# Patient Record
Sex: MALE | Race: WHITE | NOT HISPANIC OR LATINO | Employment: OTHER | URBAN - METROPOLITAN AREA
[De-identification: names, ages, dates, MRNs, and addresses within clinical notes are randomized per-mention and may not be internally consistent; named-entity substitution may affect disease eponyms.]

---

## 2023-03-09 ENCOUNTER — TRANSCRIBE ORDERS (OUTPATIENT)
Dept: PRIMARY CARE | Facility: CLINIC | Age: 78
End: 2023-03-09
Payer: COMMERCIAL

## 2023-03-09 DIAGNOSIS — I10 HTN (HYPERTENSION), BENIGN: Primary | ICD-10-CM

## 2023-03-09 RX ORDER — LISINOPRIL AND HYDROCHLOROTHIAZIDE 10; 12.5 MG/1; MG/1
1 TABLET ORAL ONCE
Status: CANCELLED | OUTPATIENT
Start: 2023-03-09 | End: 2023-03-09

## 2023-03-09 RX ORDER — VERAPAMIL HYDROCHLORIDE 240 MG/1
1 TABLET, FILM COATED, EXTENDED RELEASE ORAL DAILY
COMMUNITY
Start: 2020-12-23 | End: 2023-03-09 | Stop reason: SDUPTHER

## 2023-03-09 RX ORDER — LISINOPRIL AND HYDROCHLOROTHIAZIDE 12.5; 2 MG/1; MG/1
1 TABLET ORAL DAILY
COMMUNITY
Start: 2020-10-30 | End: 2023-03-09 | Stop reason: SDUPTHER

## 2023-03-14 RX ORDER — LISINOPRIL AND HYDROCHLOROTHIAZIDE 12.5; 2 MG/1; MG/1
1 TABLET ORAL DAILY
Qty: 90 TABLET | Refills: 1 | Status: SHIPPED | OUTPATIENT
Start: 2023-03-14 | End: 2023-03-20 | Stop reason: SDUPTHER

## 2023-03-14 RX ORDER — VERAPAMIL HYDROCHLORIDE 240 MG/1
240 TABLET, FILM COATED, EXTENDED RELEASE ORAL DAILY
Qty: 90 TABLET | Refills: 1 | Status: SHIPPED | OUTPATIENT
Start: 2023-03-14 | End: 2023-03-20 | Stop reason: SDUPTHER

## 2023-03-20 ENCOUNTER — TELEPHONE (OUTPATIENT)
Dept: PRIMARY CARE | Facility: CLINIC | Age: 78
End: 2023-03-20
Payer: COMMERCIAL

## 2023-03-20 DIAGNOSIS — I10 HTN (HYPERTENSION), BENIGN: ICD-10-CM

## 2023-03-20 RX ORDER — VERAPAMIL HYDROCHLORIDE 240 MG/1
240 TABLET, FILM COATED, EXTENDED RELEASE ORAL DAILY
Qty: 90 TABLET | Refills: 1 | Status: SHIPPED | OUTPATIENT
Start: 2023-03-20 | End: 2023-08-08

## 2023-03-20 RX ORDER — LISINOPRIL AND HYDROCHLOROTHIAZIDE 12.5; 2 MG/1; MG/1
1 TABLET ORAL DAILY
Qty: 90 TABLET | Refills: 1 | Status: SHIPPED | OUTPATIENT
Start: 2023-03-20 | End: 2023-08-08

## 2023-05-01 DIAGNOSIS — E78.5 HYPERLIPIDEMIA, UNSPECIFIED HYPERLIPIDEMIA TYPE: Primary | ICD-10-CM

## 2023-05-01 RX ORDER — ATORVASTATIN CALCIUM 10 MG/1
10 TABLET, FILM COATED ORAL DAILY
Qty: 90 TABLET | Refills: 1 | Status: SHIPPED | OUTPATIENT
Start: 2023-05-01 | End: 2023-10-18

## 2023-05-01 RX ORDER — ATORVASTATIN CALCIUM 10 MG/1
1 TABLET, FILM COATED ORAL DAILY
COMMUNITY
Start: 2020-10-30 | End: 2023-05-01 | Stop reason: SDUPTHER

## 2023-05-08 ENCOUNTER — OFFICE VISIT (OUTPATIENT)
Dept: PRIMARY CARE | Facility: CLINIC | Age: 78
End: 2023-05-08
Payer: COMMERCIAL

## 2023-05-08 VITALS
WEIGHT: 146 LBS | BODY MASS INDEX: 22.13 KG/M2 | OXYGEN SATURATION: 99 % | DIASTOLIC BLOOD PRESSURE: 66 MMHG | SYSTOLIC BLOOD PRESSURE: 134 MMHG | HEART RATE: 64 BPM | TEMPERATURE: 98 F | HEIGHT: 68 IN

## 2023-05-08 DIAGNOSIS — Z00.00 ROUTINE GENERAL MEDICAL EXAMINATION AT A HEALTH CARE FACILITY: ICD-10-CM

## 2023-05-08 DIAGNOSIS — Z12.11 SCREENING FOR COLORECTAL CANCER: ICD-10-CM

## 2023-05-08 DIAGNOSIS — Z23 NEED FOR VACCINATION: ICD-10-CM

## 2023-05-08 DIAGNOSIS — Z00.00 ROUTINE GENERAL MEDICAL EXAMINATION AT HEALTH CARE FACILITY: Primary | ICD-10-CM

## 2023-05-08 DIAGNOSIS — Z12.12 SCREENING FOR COLORECTAL CANCER: ICD-10-CM

## 2023-05-08 PROBLEM — E78.2 HYPERLIPEMIA, MIXED: Status: ACTIVE | Noted: 2023-05-08

## 2023-05-08 PROBLEM — I10 BENIGN ESSENTIAL HYPERTENSION: Status: ACTIVE | Noted: 2023-05-08

## 2023-05-08 PROCEDURE — 3078F DIAST BP <80 MM HG: CPT | Performed by: FAMILY MEDICINE

## 2023-05-08 PROCEDURE — 1159F MED LIST DOCD IN RCRD: CPT | Performed by: FAMILY MEDICINE

## 2023-05-08 PROCEDURE — G0009 ADMIN PNEUMOCOCCAL VACCINE: HCPCS | Performed by: FAMILY MEDICINE

## 2023-05-08 PROCEDURE — 99397 PER PM REEVAL EST PAT 65+ YR: CPT | Performed by: FAMILY MEDICINE

## 2023-05-08 PROCEDURE — 3075F SYST BP GE 130 - 139MM HG: CPT | Performed by: FAMILY MEDICINE

## 2023-05-08 PROCEDURE — 1170F FXNL STATUS ASSESSED: CPT | Performed by: FAMILY MEDICINE

## 2023-05-08 PROCEDURE — 1160F RVW MEDS BY RX/DR IN RCRD: CPT | Performed by: FAMILY MEDICINE

## 2023-05-08 PROCEDURE — 82274 ASSAY TEST FOR BLOOD FECAL: CPT

## 2023-05-08 PROCEDURE — 90677 PCV20 VACCINE IM: CPT | Performed by: FAMILY MEDICINE

## 2023-05-08 PROCEDURE — G0439 PPPS, SUBSEQ VISIT: HCPCS | Performed by: FAMILY MEDICINE

## 2023-05-08 PROCEDURE — 1036F TOBACCO NON-USER: CPT | Performed by: FAMILY MEDICINE

## 2023-05-08 ASSESSMENT — ACTIVITIES OF DAILY LIVING (ADL)
DRESSING: INDEPENDENT
GROCERY_SHOPPING: INDEPENDENT
TAKING_MEDICATION: INDEPENDENT
BATHING: INDEPENDENT
DOING_HOUSEWORK: INDEPENDENT
MANAGING_FINANCES: INDEPENDENT

## 2023-05-08 ASSESSMENT — PATIENT HEALTH QUESTIONNAIRE - PHQ9
10. IF YOU CHECKED OFF ANY PROBLEMS, HOW DIFFICULT HAVE THESE PROBLEMS MADE IT FOR YOU TO DO YOUR WORK, TAKE CARE OF THINGS AT HOME, OR GET ALONG WITH OTHER PEOPLE: NOT DIFFICULT AT ALL
2. FEELING DOWN, DEPRESSED OR HOPELESS: SEVERAL DAYS
1. LITTLE INTEREST OR PLEASURE IN DOING THINGS: NOT AT ALL
SUM OF ALL RESPONSES TO PHQ9 QUESTIONS 1 AND 2: 1

## 2023-05-08 ASSESSMENT — PAIN SCALES - GENERAL: PAINLEVEL: 0-NO PAIN

## 2023-05-08 ASSESSMENT — ENCOUNTER SYMPTOMS: HYPERTENSION: 1

## 2023-05-08 NOTE — PROGRESS NOTES
"Subjective   Reason for Visit: Jaspal Anderson is an 77 y.o. male here for a Medicare Wellness visit.     Past Medical, Surgical, and Family History reviewed and updated in chart.    Reviewed all medications by prescribing practitioner or clinical pharmacist (such as prescriptions, OTCs, herbal therapies and supplements) and documented in the medical record.    Hypertension        Patient Care Team:  Taty Domínguez MD as PCP - General (Family Medicine)  Marshall Renteria MD as PCP - [facility] Attributed Provider     Review of Systems    Objective   Vitals:  /66 (BP Location: Right arm, Patient Position: Sitting, BP Cuff Size: Adult)   Pulse 64   Temp 36.7 °C (98 °F) (Temporal)   Ht 1.727 m (5' 8\")   Wt 66.2 kg (146 lb)   SpO2 99%   BMI 22.20 kg/m²       Physical Exam  Vitals reviewed.   Constitutional:       Appearance: Normal appearance.   Cardiovascular:      Rate and Rhythm: Normal rate and regular rhythm.      Heart sounds: No murmur heard.  Pulmonary:      Effort: Pulmonary effort is normal.      Breath sounds: Normal breath sounds.   Musculoskeletal:      Right lower leg: No edema.      Left lower leg: No edema.   Neurological:      Mental Status: He is alert.   Psychiatric:         Mood and Affect: Mood normal.         Behavior: Behavior normal.         Assessment/Plan   Problem List Items Addressed This Visit    None  Visit Diagnoses       Routine general medical examination at health care facility    -  Primary    Relevant Orders    1 Year Follow Up In [facility] - PCP - Wellness Exam    Need for vaccination        Relevant Orders    Pneumococcal conjugate vaccine 20-valent IM    Routine general medical examination at a health care facility        Screening for colorectal cancer        Relevant Orders    Fecal Occult Bld Immunoassay               "

## 2023-05-08 NOTE — PATIENT INSTRUCTIONS
Immunizations recommended:  --Flu shot every fall.  --Prevnar 20 once, to reduce risk of pneumonia.  Done today.  --Shingrix, to reduce the risk of shingles.  It can be done at the pharmacy.    --Tetanus every 10 years.  --Covid vaccine.    Colonoscopy should be done every 10 years after the age of 45, unless there was a previous abnormality, or family history of colon cancer.  Alternatives would be Cologuard every 3 years (looks for genetic evidence of colon cancer in stool), or stool FIT stool test yearly (looks for microscopic blood in stool)--FIT given to do and send back.    You should try to get 150 minutes of exercise weekly.  Be sure to eat a diet rich in fruits and vegetables, and low in saturated fats and sodium.  Strive for a healthy BMI of less than 25.     Make sure to wear sun screen when outside, and check for changing or unusual moles.    I recommend you get a Living Will and Durable Power of  for Healthcare. These documents state what care you would want if you couldn't speak for yourself. They help your loved ones in caring for you if that happens.   Once you have those forms completed, you can keep a copy on file with your doctor.    Specialists being seen:  Dr. Maldonado for prostate.    BP well controlled.  Continue lisinopril 20 mg, hydrochlorothiazide 12.5 mg, and verapamil 240 mg daily.  Will check blood work next visit in 6 months.                                                                                           Blood Pressure Treatment goals include:                                                                                    BP of 120/80, with no higher than 140/85 on consistent basis.                               Exercise at 150 minutes weekly.  Eat a balanced diet, rich in fruits and vegetables, low in sodium and processed foods.  If you are overweight, work toward a goal BMI of less than 25.    Continue atorvastatin for cholesterol.

## 2023-05-13 LAB — FECAL OCCULT BLD IMMUNOASSAY: NEGATIVE

## 2023-08-07 DIAGNOSIS — I10 HTN (HYPERTENSION), BENIGN: ICD-10-CM

## 2023-08-08 RX ORDER — LISINOPRIL AND HYDROCHLOROTHIAZIDE 12.5; 2 MG/1; MG/1
1 TABLET ORAL DAILY
Qty: 90 TABLET | Refills: 1 | Status: SHIPPED | OUTPATIENT
Start: 2023-08-08 | End: 2024-01-25

## 2023-08-08 RX ORDER — VERAPAMIL HYDROCHLORIDE 240 MG/1
240 TABLET, FILM COATED, EXTENDED RELEASE ORAL DAILY
Qty: 90 TABLET | Refills: 1 | Status: SHIPPED | OUTPATIENT
Start: 2023-08-08 | End: 2024-01-25

## 2023-10-17 DIAGNOSIS — E78.5 HYPERLIPIDEMIA, UNSPECIFIED HYPERLIPIDEMIA TYPE: ICD-10-CM

## 2023-10-18 RX ORDER — ATORVASTATIN CALCIUM 10 MG/1
10 TABLET, FILM COATED ORAL DAILY
Qty: 90 TABLET | Refills: 3 | Status: SHIPPED | OUTPATIENT
Start: 2023-10-18

## 2023-11-13 ENCOUNTER — OFFICE VISIT (OUTPATIENT)
Dept: PRIMARY CARE | Facility: CLINIC | Age: 78
End: 2023-11-13
Payer: COMMERCIAL

## 2023-11-13 VITALS
SYSTOLIC BLOOD PRESSURE: 122 MMHG | HEIGHT: 69 IN | TEMPERATURE: 97.6 F | DIASTOLIC BLOOD PRESSURE: 60 MMHG | WEIGHT: 145.2 LBS | HEART RATE: 64 BPM | BODY MASS INDEX: 21.51 KG/M2 | OXYGEN SATURATION: 94 %

## 2023-11-13 DIAGNOSIS — I10 BENIGN ESSENTIAL HYPERTENSION: Primary | ICD-10-CM

## 2023-11-13 DIAGNOSIS — E78.2 HYPERLIPEMIA, MIXED: ICD-10-CM

## 2023-11-13 LAB
ANION GAP SERPL CALC-SCNC: 14 MMOL/L (ref 10–20)
BUN SERPL-MCNC: 17 MG/DL (ref 6–23)
CALCIUM SERPL-MCNC: 9.7 MG/DL (ref 8.6–10.6)
CHLORIDE SERPL-SCNC: 101 MMOL/L (ref 98–107)
CHOLEST SERPL-MCNC: 136 MG/DL (ref 0–199)
CHOLESTEROL/HDL RATIO: 2.5
CO2 SERPL-SCNC: 28 MMOL/L (ref 21–32)
CREAT SERPL-MCNC: 0.77 MG/DL (ref 0.5–1.3)
GFR SERPL CREATININE-BSD FRML MDRD: >90 ML/MIN/1.73M*2
GLUCOSE SERPL-MCNC: 93 MG/DL (ref 74–99)
HDLC SERPL-MCNC: 53.4 MG/DL
LDLC SERPL CALC-MCNC: 62 MG/DL
NON HDL CHOLESTEROL: 83 MG/DL (ref 0–149)
POTASSIUM SERPL-SCNC: 4 MMOL/L (ref 3.5–5.3)
SODIUM SERPL-SCNC: 139 MMOL/L (ref 136–145)
TRIGL SERPL-MCNC: 102 MG/DL (ref 0–149)
VLDL: 20 MG/DL (ref 0–40)

## 2023-11-13 PROCEDURE — 1159F MED LIST DOCD IN RCRD: CPT | Performed by: FAMILY MEDICINE

## 2023-11-13 PROCEDURE — 3074F SYST BP LT 130 MM HG: CPT | Performed by: FAMILY MEDICINE

## 2023-11-13 PROCEDURE — 80061 LIPID PANEL: CPT

## 2023-11-13 PROCEDURE — 3078F DIAST BP <80 MM HG: CPT | Performed by: FAMILY MEDICINE

## 2023-11-13 PROCEDURE — 1036F TOBACCO NON-USER: CPT | Performed by: FAMILY MEDICINE

## 2023-11-13 PROCEDURE — 1160F RVW MEDS BY RX/DR IN RCRD: CPT | Performed by: FAMILY MEDICINE

## 2023-11-13 PROCEDURE — 99213 OFFICE O/P EST LOW 20 MIN: CPT | Performed by: FAMILY MEDICINE

## 2023-11-13 PROCEDURE — 36415 COLL VENOUS BLD VENIPUNCTURE: CPT

## 2023-11-13 PROCEDURE — 80048 BASIC METABOLIC PNL TOTAL CA: CPT

## 2023-11-13 PROCEDURE — 1126F AMNT PAIN NOTED NONE PRSNT: CPT | Performed by: FAMILY MEDICINE

## 2023-11-13 ASSESSMENT — PATIENT HEALTH QUESTIONNAIRE - PHQ9
2. FEELING DOWN, DEPRESSED OR HOPELESS: SEVERAL DAYS
1. LITTLE INTEREST OR PLEASURE IN DOING THINGS: NOT AT ALL
SUM OF ALL RESPONSES TO PHQ9 QUESTIONS 1 AND 2: 1

## 2023-11-13 ASSESSMENT — ENCOUNTER SYMPTOMS
HEADACHES: 0
HYPERTENSION: 1
PALPITATIONS: 0
SHORTNESS OF BREATH: 0

## 2023-11-13 ASSESSMENT — PAIN SCALES - GENERAL: PAINLEVEL: 0-NO PAIN

## 2023-11-13 NOTE — PROGRESS NOTES
"Subjective   Patient ID: Jaspal Anderson is a 77 y.o. male who presents for Blood Pressure Check and Follow-up.  Not checking BP.  Feels well.      Hypertension  This is a chronic problem. The problem is controlled. Pertinent negatives include no anxiety, chest pain, headaches, palpitations, peripheral edema or shortness of breath. The current treatment provides significant improvement. There are no compliance problems.  There is no history of angina, kidney disease, CAD/MI, CVA or heart failure.       Review of Systems   Respiratory:  Negative for shortness of breath.    Cardiovascular:  Negative for chest pain and palpitations.   Neurological:  Negative for headaches.       Objective   /60   Pulse 64   Temp 36.4 °C (97.6 °F) (Temporal)   Ht 1.753 m (5' 9\")   Wt 65.9 kg (145 lb 3.2 oz)   SpO2 94%   BMI 21.44 kg/m²     Physical Exam  Vitals reviewed.   Constitutional:       Appearance: Normal appearance.   Cardiovascular:      Rate and Rhythm: Normal rate and regular rhythm.      Heart sounds: No murmur heard.  Pulmonary:      Effort: Pulmonary effort is normal.      Breath sounds: Normal breath sounds.   Musculoskeletal:      Right lower leg: No edema.      Left lower leg: No edema.   Neurological:      Mental Status: He is alert.           Assessment/Plan   Problem List Items Addressed This Visit       Benign essential hypertension - Primary    Relevant Orders    Basic Metabolic Panel    Lipid Panel    Hyperlipemia, mixed     Other Visit Diagnoses       Body mass index (BMI) 21.0-21.9, adult                   "

## 2023-11-13 NOTE — PATIENT INSTRUCTIONS
BP well controlled.  Continue lisinopril-hydrochlorothiazide and verapamil .  Check fasting blood work today.  Plan to follow up in 6 months for annual wellness visit.                                                                                         Blood Pressure Treatment goals include:                                                                                    BP of 120/80, with no higher than 140/85 on consistent basis.                               Exercise at 150 minutes weekly.  Eat a balanced diet, rich in fruits and vegetables, low in sodium and processed foods.    Prescription given for home BP monitor, if insurance covers.  Om[employer] is a good brand.    For cholesterol, taking atorvastatin.

## 2024-05-13 ENCOUNTER — OFFICE VISIT (OUTPATIENT)
Dept: PRIMARY CARE | Facility: CLINIC | Age: 79
End: 2024-05-13
Payer: COMMERCIAL

## 2024-05-13 VITALS
BODY MASS INDEX: 24.06 KG/M2 | HEIGHT: 65 IN | WEIGHT: 144.4 LBS | TEMPERATURE: 98.2 F | OXYGEN SATURATION: 96 % | HEART RATE: 60 BPM | DIASTOLIC BLOOD PRESSURE: 80 MMHG | SYSTOLIC BLOOD PRESSURE: 152 MMHG

## 2024-05-13 DIAGNOSIS — I10 BENIGN ESSENTIAL HYPERTENSION: ICD-10-CM

## 2024-05-13 DIAGNOSIS — Z00.00 ROUTINE GENERAL MEDICAL EXAMINATION AT HEALTH CARE FACILITY: Primary | ICD-10-CM

## 2024-05-13 DIAGNOSIS — I10 HTN (HYPERTENSION), BENIGN: ICD-10-CM

## 2024-05-13 DIAGNOSIS — E78.2 HYPERLIPEMIA, MIXED: ICD-10-CM

## 2024-05-13 PROCEDURE — 3079F DIAST BP 80-89 MM HG: CPT | Performed by: FAMILY MEDICINE

## 2024-05-13 PROCEDURE — 1170F FXNL STATUS ASSESSED: CPT | Performed by: FAMILY MEDICINE

## 2024-05-13 PROCEDURE — 1160F RVW MEDS BY RX/DR IN RCRD: CPT | Performed by: FAMILY MEDICINE

## 2024-05-13 PROCEDURE — G0439 PPPS, SUBSEQ VISIT: HCPCS | Performed by: FAMILY MEDICINE

## 2024-05-13 PROCEDURE — 3077F SYST BP >= 140 MM HG: CPT | Performed by: FAMILY MEDICINE

## 2024-05-13 PROCEDURE — 1036F TOBACCO NON-USER: CPT | Performed by: FAMILY MEDICINE

## 2024-05-13 PROCEDURE — 1159F MED LIST DOCD IN RCRD: CPT | Performed by: FAMILY MEDICINE

## 2024-05-13 PROCEDURE — 1158F ADVNC CARE PLAN TLK DOCD: CPT | Performed by: FAMILY MEDICINE

## 2024-05-13 PROCEDURE — 1123F ACP DISCUSS/DSCN MKR DOCD: CPT | Performed by: FAMILY MEDICINE

## 2024-05-13 RX ORDER — LISINOPRIL AND HYDROCHLOROTHIAZIDE 20; 25 MG/1; MG/1
1 TABLET ORAL DAILY
Qty: 100 TABLET | Refills: 1 | Status: SHIPPED | OUTPATIENT
Start: 2024-05-13 | End: 2024-11-29

## 2024-05-13 ASSESSMENT — ACTIVITIES OF DAILY LIVING (ADL)
MANAGING_FINANCES: INDEPENDENT
DOING_HOUSEWORK: INDEPENDENT
BATHING: INDEPENDENT
TAKING_MEDICATION: INDEPENDENT
GROCERY_SHOPPING: INDEPENDENT
DRESSING: INDEPENDENT

## 2024-05-13 ASSESSMENT — PATIENT HEALTH QUESTIONNAIRE - PHQ9
1. LITTLE INTEREST OR PLEASURE IN DOING THINGS: NOT AT ALL
2. FEELING DOWN, DEPRESSED OR HOPELESS: NOT AT ALL
SUM OF ALL RESPONSES TO PHQ9 QUESTIONS 1 AND 2: 0

## 2024-05-13 ASSESSMENT — ENCOUNTER SYMPTOMS
DEPRESSION: 0
LOSS OF SENSATION IN FEET: 0
OCCASIONAL FEELINGS OF UNSTEADINESS: 0

## 2024-05-13 NOTE — PATIENT INSTRUCTIONS
Immunizations recommended:  --Flu shot every fall.  --Prevnar 20 once, to reduce risk of pneumonia, which you've done.  --Shingrix, to reduce the risk of shingles.  It can be done at the pharmacy.    --Tetanus every 10 years.  --Covid vaccine.  --RSV vaccine once.     Colonoscopy should be done every 10 years after the age of 45, unless there was a previous abnormality, or family history of colon cancer.  Alternatives would be Cologuard every 3 years (looks for genetic evidence of colon cancer in stool), or stool FIT stool test yearly (looks for microscopic blood in stool)  --will plan to do FIT test, but currently on back order, so will  let you know when.     You should try to get 150 minutes of exercise weekly.  Be sure to eat a diet rich in fruits and vegetables, and low in saturated fats and sodium.  Strive for a healthy BMI of less than 25.      Make sure to wear sun screen when outside, and check for changing or unusual moles.  Skin lesion on left collarbone  area  looks like a benign seborrheic keratosis.  Continue  to  monitor.     I recommend you get a Living Will and Durable Power of  for Healthcare. These documents state what care you would want if you couldn't speak for yourself. They help your loved ones in caring for you if that happens.   Once you have those forms completed, you can keep a copy on file with your doctor.     Specialists being seen:  Dr. Maldonado for prostate.     BP is borderline high.  Continue lisinopril 20 mg, hydrochlorothiazide 12.5 mg until done with current bottle, then  will change to 20-25 for a little better control.  Continue   verapamil 240 mg daily.                                                                                      Blood Pressure Treatment goals include:                                                                                    BP of 120/80, with no higher than 140/85 on consistent basis.                               Exercise at 150  minutes weekly.  Eat a balanced diet, rich in fruits and vegetables, low in sodium and processed foods.  If you are overweight, work toward a goal BMI of less than 25.     Continue atorvastatin for cholesterol.    Follow up 4-6 months.  Will  check fasting blood work then.

## 2024-06-01 DIAGNOSIS — I10 HTN (HYPERTENSION), BENIGN: ICD-10-CM

## 2024-06-02 RX ORDER — VERAPAMIL HYDROCHLORIDE 240 MG/1
240 TABLET, FILM COATED, EXTENDED RELEASE ORAL DAILY
Qty: 90 TABLET | Refills: 1 | Status: SHIPPED | OUTPATIENT
Start: 2024-06-02

## 2024-08-25 DIAGNOSIS — E78.5 HYPERLIPIDEMIA, UNSPECIFIED HYPERLIPIDEMIA TYPE: ICD-10-CM

## 2024-08-28 RX ORDER — ATORVASTATIN CALCIUM 10 MG/1
10 TABLET, FILM COATED ORAL DAILY
Qty: 90 TABLET | Refills: 3 | Status: SHIPPED | OUTPATIENT
Start: 2024-08-28

## 2024-11-18 ENCOUNTER — APPOINTMENT (OUTPATIENT)
Dept: PRIMARY CARE | Facility: CLINIC | Age: 79
End: 2024-11-18
Payer: COMMERCIAL

## 2024-11-18 VITALS
WEIGHT: 142 LBS | SYSTOLIC BLOOD PRESSURE: 120 MMHG | OXYGEN SATURATION: 97 % | HEART RATE: 65 BPM | BODY MASS INDEX: 23.66 KG/M2 | DIASTOLIC BLOOD PRESSURE: 52 MMHG | HEIGHT: 65 IN

## 2024-11-18 DIAGNOSIS — Z23 ENCOUNTER FOR IMMUNIZATION: ICD-10-CM

## 2024-11-18 DIAGNOSIS — I10 BENIGN ESSENTIAL HYPERTENSION: Primary | ICD-10-CM

## 2024-11-18 DIAGNOSIS — E78.2 HYPERLIPEMIA, MIXED: ICD-10-CM

## 2024-11-18 LAB
ALBUMIN SERPL BCP-MCNC: 4.6 G/DL (ref 3.4–5)
ALP SERPL-CCNC: 82 U/L (ref 33–136)
ALT SERPL W P-5'-P-CCNC: 21 U/L (ref 10–52)
ANION GAP SERPL CALC-SCNC: 14 MMOL/L (ref 10–20)
AST SERPL W P-5'-P-CCNC: 26 U/L (ref 9–39)
BILIRUB SERPL-MCNC: 1.2 MG/DL (ref 0–1.2)
BUN SERPL-MCNC: 19 MG/DL (ref 6–23)
CALCIUM SERPL-MCNC: 9.8 MG/DL (ref 8.6–10.6)
CHLORIDE SERPL-SCNC: 103 MMOL/L (ref 98–107)
CHOLEST SERPL-MCNC: 151 MG/DL (ref 0–199)
CHOLESTEROL/HDL RATIO: 2.6
CO2 SERPL-SCNC: 26 MMOL/L (ref 21–32)
CREAT SERPL-MCNC: 0.76 MG/DL (ref 0.5–1.3)
EGFRCR SERPLBLD CKD-EPI 2021: >90 ML/MIN/1.73M*2
GLUCOSE SERPL-MCNC: 98 MG/DL (ref 74–99)
HDLC SERPL-MCNC: 58.4 MG/DL
LDLC SERPL CALC-MCNC: 73 MG/DL
NON HDL CHOLESTEROL: 93 MG/DL (ref 0–149)
POTASSIUM SERPL-SCNC: 4.1 MMOL/L (ref 3.5–5.3)
PROT SERPL-MCNC: 7.5 G/DL (ref 6.4–8.2)
SODIUM SERPL-SCNC: 139 MMOL/L (ref 136–145)
TRIGL SERPL-MCNC: 99 MG/DL (ref 0–149)
VLDL: 20 MG/DL (ref 0–40)

## 2024-11-18 PROCEDURE — 1160F RVW MEDS BY RX/DR IN RCRD: CPT | Performed by: FAMILY MEDICINE

## 2024-11-18 PROCEDURE — 91320 SARSCV2 VAC 30MCG TRS-SUC IM: CPT | Performed by: FAMILY MEDICINE

## 2024-11-18 PROCEDURE — 90480 ADMN SARSCOV2 VAC 1/ONLY CMP: CPT | Performed by: FAMILY MEDICINE

## 2024-11-18 PROCEDURE — 3074F SYST BP LT 130 MM HG: CPT | Performed by: FAMILY MEDICINE

## 2024-11-18 PROCEDURE — 99213 OFFICE O/P EST LOW 20 MIN: CPT | Performed by: FAMILY MEDICINE

## 2024-11-18 PROCEDURE — 1159F MED LIST DOCD IN RCRD: CPT | Performed by: FAMILY MEDICINE

## 2024-11-18 PROCEDURE — 3078F DIAST BP <80 MM HG: CPT | Performed by: FAMILY MEDICINE

## 2024-11-18 PROCEDURE — 80061 LIPID PANEL: CPT

## 2024-11-18 PROCEDURE — 1123F ACP DISCUSS/DSCN MKR DOCD: CPT | Performed by: FAMILY MEDICINE

## 2024-11-18 PROCEDURE — 80053 COMPREHEN METABOLIC PANEL: CPT

## 2024-11-18 PROCEDURE — 1036F TOBACCO NON-USER: CPT | Performed by: FAMILY MEDICINE

## 2024-11-18 ASSESSMENT — ENCOUNTER SYMPTOMS
HEADACHES: 0
SHORTNESS OF BREATH: 0
PALPITATIONS: 0
HYPERTENSION: 1

## 2024-11-18 NOTE — PATIENT INSTRUCTIONS
BP is well controlled.  Continue lisinopril hydrochlorothiazide 20-25 and  verapamil 240 mg daily.                                                                                      Blood Pressure Treatment goals include:                                                                                    BP of 120/80, with no higher than 140/85 on consistent basis.                               Get regular activity/exercise.  Eat a balanced diet, rich in fruits and vegetables, low in sodium and processed foods.     Continue atorvastatin for cholesterol.    Will check fasting blood work this AM.    Flu shot done at the pharmacy.  Covid vaccine done today.

## 2024-11-18 NOTE — PROGRESS NOTES
"Subjective   Patient ID: Jaspal Anderson is a 78 y.o. male who presents for Follow-up (6 month ) and Hypertension.  Home BP's have been generally good.  Has a couple that are in 140's, one that is 150's, otherwise below 140.  Pulse runs 50-60's.  No CP, SOB, or ankle swelling.    Notes some trouble with memory, short term, names etc.  Has noted balance is more of a problem now that he is paying attention.  No problems with driving, leaving stove on, or other more serious memory issues.    Sleep and appetite have been good.  Lives alone, but no trouble getting meals.  Sister will sometimes make things and bring them over.  Also cooks for himself.      Hypertension  This is a chronic problem. The problem is controlled. Pertinent negatives include no anxiety, chest pain, headaches, malaise/fatigue, palpitations, peripheral edema or shortness of breath. The current treatment provides significant improvement. There are no compliance problems.  There is no history of angina, kidney disease, CAD/MI, CVA or PVD.       Review of Systems   Constitutional:  Negative for malaise/fatigue.   Respiratory:  Negative for shortness of breath.    Cardiovascular:  Negative for chest pain and palpitations.   Neurological:  Negative for headaches.       Objective   /52   Pulse 65   Ht 1.651 m (5' 5\")   Wt 64.4 kg (142 lb)   SpO2 97%   BMI 23.63 kg/m²     Physical Exam  Vitals reviewed.   Constitutional:       Appearance: Normal appearance.   Cardiovascular:      Rate and Rhythm: Normal rate and regular rhythm.      Heart sounds: No murmur heard.  Pulmonary:      Effort: Pulmonary effort is normal.      Breath sounds: Normal breath sounds.   Musculoskeletal:      Right lower leg: No edema.      Left lower leg: No edema.   Neurological:      Mental Status: He is alert.           Assessment/Plan   Problem List Items Addressed This Visit       Benign essential hypertension - Primary    Relevant Orders    Comprehensive Metabolic " Panel    Hyperlipemia, mixed    Relevant Orders    Comprehensive Metabolic Panel    Lipid Panel     Other Visit Diagnoses       Encounter for immunization        Relevant Orders    Pfizer COVID-19 vaccine, monovalent, age 12 years and older (30 mcg/0.3 mL) (Comirnaty)    Body mass index (BMI) 23.0-23.9, adult

## 2024-12-02 DIAGNOSIS — I10 HTN (HYPERTENSION), BENIGN: ICD-10-CM

## 2024-12-03 RX ORDER — LISINOPRIL AND HYDROCHLOROTHIAZIDE 20; 25 MG/1; MG/1
1 TABLET ORAL DAILY
Qty: 90 TABLET | Refills: 1 | Status: SHIPPED | OUTPATIENT
Start: 2024-12-03 | End: 2025-06-01

## 2025-01-18 DIAGNOSIS — I10 HTN (HYPERTENSION), BENIGN: ICD-10-CM

## 2025-01-19 RX ORDER — VERAPAMIL HCL 240 MG
240 TABLET, EXTENDED RELEASE ORAL DAILY
Qty: 90 TABLET | Refills: 1 | Status: SHIPPED | OUTPATIENT
Start: 2025-01-19

## 2025-01-22 ENCOUNTER — TELEPHONE (OUTPATIENT)
Dept: PRIMARY CARE | Facility: CLINIC | Age: 80
End: 2025-01-22
Payer: COMMERCIAL

## 2025-01-22 DIAGNOSIS — R41.3 MEMORY DIFFICULTY: Primary | ICD-10-CM

## 2025-01-22 DIAGNOSIS — F69 BEHAVIOR CONCERN IN ADULT: ICD-10-CM

## 2025-01-22 NOTE — TELEPHONE ENCOUNTER
[place]  requesting a mental status evaluation. Mr. Dodd would like to discuss your patient    Never smoker

## 2025-01-23 NOTE — TELEPHONE ENCOUNTER
"Spoke with the .  In there community the patient lives near a woman, who he has been \"harassing\", although he doesn't seem to get it.  She had become  in the last couple years, and patient had been asking her a lot of questions, wanting to be a friend.  Eventually she complained, things went to court, and he has a restraining order for no contact and not to be within 500 feet of her.  He broke it again, and  is not going to court for contempt.  Court is requesting mental evaluation.    Will refer him for testing, or have him see neuropsych to get evaluated.  Will need to contact him as well, to let him know.   "

## 2025-02-19 ENCOUNTER — OFFICE VISIT (OUTPATIENT)
Facility: CLINIC | Age: 80
End: 2025-02-19
Payer: COMMERCIAL

## 2025-02-19 VITALS
WEIGHT: 143.5 LBS | DIASTOLIC BLOOD PRESSURE: 88 MMHG | BODY MASS INDEX: 22.52 KG/M2 | SYSTOLIC BLOOD PRESSURE: 210 MMHG | HEIGHT: 67 IN | HEART RATE: 63 BPM

## 2025-02-19 DIAGNOSIS — R41.3 MEMORY LOSS: Primary | ICD-10-CM

## 2025-02-19 DIAGNOSIS — F41.1 GENERALIZED ANXIETY DISORDER: ICD-10-CM

## 2025-02-19 PROCEDURE — 3077F SYST BP >= 140 MM HG: CPT | Performed by: STUDENT IN AN ORGANIZED HEALTH CARE EDUCATION/TRAINING PROGRAM

## 2025-02-19 PROCEDURE — 1123F ACP DISCUSS/DSCN MKR DOCD: CPT | Performed by: STUDENT IN AN ORGANIZED HEALTH CARE EDUCATION/TRAINING PROGRAM

## 2025-02-19 PROCEDURE — G2211 COMPLEX E/M VISIT ADD ON: HCPCS | Performed by: STUDENT IN AN ORGANIZED HEALTH CARE EDUCATION/TRAINING PROGRAM

## 2025-02-19 PROCEDURE — 99205 OFFICE O/P NEW HI 60 MIN: CPT | Performed by: STUDENT IN AN ORGANIZED HEALTH CARE EDUCATION/TRAINING PROGRAM

## 2025-02-19 PROCEDURE — 3079F DIAST BP 80-89 MM HG: CPT | Performed by: STUDENT IN AN ORGANIZED HEALTH CARE EDUCATION/TRAINING PROGRAM

## 2025-02-19 PROCEDURE — 1159F MED LIST DOCD IN RCRD: CPT | Performed by: STUDENT IN AN ORGANIZED HEALTH CARE EDUCATION/TRAINING PROGRAM

## 2025-02-19 RX ORDER — SERTRALINE HYDROCHLORIDE 25 MG/1
25 TABLET, FILM COATED ORAL DAILY
Qty: 30 TABLET | Refills: 3 | Status: SHIPPED | OUTPATIENT
Start: 2025-02-19 | End: 2025-06-19

## 2025-02-19 NOTE — PROGRESS NOTES
"Subjective   Mr. Anderson 79 y.o. year old male is accompanied by: her alone  Consult Requested By: {geriatric consult requested by:[id number]}  Reason for consultation or primary concern: No chief complaint on file.    HPI   Patient states that he does not think he had a memory loss.  Patient thinks it is a legal thing because he did not do what they said.  Patient states that he became friends with his neighbor.  He did not want her to come into his house.  Neighbor kept asking patient to come over to get plants.  Patient thinks his neighbor used him to make another man jealous \"Mukul\".  Neighbor's  passed away, and he wrote her a sympathy card.  Next thing patient knows there is a  showing up at his doorstep asking him to stay away from his neighbor.  Patient was given a restraining order to not contact patient and to stay 500 feet away.  Patient contacted her over the phone on her birthday.  Then the police came by and spoke to the patient.  Neighbor's name is Rosemaryjudith Lynch.  Patient had to then go to court - he was put on probation.  Then patient went for a walk down the street, and the  - he was then found to be in contempt of court.  He was subsequently told to go and see a physician.  Patient was very nervous to find the appointment today.  He had to pull over and ask for directions.  He thinks being alone has been very difficult for him.   He did better when he had a friend to talk to - this was before he met rosemary.  He has been going to [employer] and volunteering.  Patient states that he was not taking the order seriously, but is now willing to do that.    Poor historian  Per patient and *** (obtained in addition due to ***)    What matters most: ***  Health Goals:    Goals    None       Short-term memory loss history:   Severity:{MILD, MOD, SEV:24964}  Duration: ***  Progression: {AGPROGRESSION:08808}  Timing: {AGTIMIN}  Associated symptoms:  Changes in mood/behavior: anxiety and " "depression - patient likes to work on his car and house.  He reports he really likes to organize his toolbox.  No SI / HI.  Changes in sleeping pattern: No.  Patient sleeps 5-6 hours a day.  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  Delirium: No  Severe COVID-19: No  Cancer: No  Psychiatric diseases: No  Psychiatric hospitalization: No  JONO: No Using CPAP?: N/A  Hearing loss: no  Hearing aids: no    History from patient:  ***    Knows 911: yes  Current President: Rich  Upcoming holiday: [place]  Current news: Negotiation of [employer] in [facility].  What matters most: \"staying alive and getting things done\".      Social history:   Alcohol:  no  Cigarettes:  no  Drugs:  no    Living environment: ***    ADLs:  Bathing: i  Dressing: i  Transfering: i  Toileting: i  Feeding oneself: i      IADLs:  Shopping: i  Driving: i  Managing finances: i  Taking medications: i  Preparing food: a - sister helps.  Housekeeping: i      Visual: glasses Yes Last exam: last July  Hearing: hearing No Last exam: n/a  Dental: dentures No Last exam: November 2024.      Sleep: how many hours at night ***, sleep aides: ***    Advanced Directives on file: <no information>      Medications reviewed and reconciled.     Objective   BP (!) 210/88 (BP Location: Right arm)   Pulse 63   Ht 1.702 m (5' 7\")   Wt 65.1 kg (143 lb 8 oz)   BMI 22.48 kg/m²     Patient was very anxious - BP decreased to 168/84 upon manual re-check.  Physical Exam        MoCA:  /30  CDT:    /5  PHQ9:   /27    No results found for: \"TSH\"  No results found for: \"FREET4\"  No results found for: \"NZFBDSUN35\"  No results found for: \"HGBA1C\"  No results found for: \"VITD25\"     No " results found for this or any previous visit from the past 365 days.     No results found for this or any previous visit from the past 365 days.     No results found for this or any previous visit from the past 365 days.      There are no diagnoses linked to this encounter.  Diagnoses and all orders for this visit:  Memory loss  -     Vitamin B12; Future  -     Tsh With Reflex To Free T4 If Abnormal; Future  -     MR brain wo IV contrast; Future  Generalized anxiety disorder  -     sertraline (Zoloft) 25 mg tablet; Take 1 tablet (25 mg) by mouth once daily.      Amari Hall MD     Patient's anxiety likely contributed to his elevated blood pressure today.  Patient advised to keep a diary of his blood pressures at home.  -     sertraline (Zoloft) 25 mg tablet; Take 1 tablet (25 mg) by mouth once daily.  - Patient advised to call with any worsening symptoms.    Patient is agreeable to plan as above.  Patient to call with any worsening symptoms.  All his questions were answered and concerns addressed.  Shared decision making was implemented.      Amari Hall MD

## 2025-03-19 ENCOUNTER — APPOINTMENT (OUTPATIENT)
Facility: CLINIC | Age: 80
End: 2025-03-19
Payer: COMMERCIAL

## 2025-04-02 ENCOUNTER — APPOINTMENT (OUTPATIENT)
Facility: CLINIC | Age: 80
End: 2025-04-02
Payer: COMMERCIAL

## 2025-05-19 ENCOUNTER — APPOINTMENT (OUTPATIENT)
Dept: PRIMARY CARE | Facility: CLINIC | Age: 80
End: 2025-05-19
Payer: COMMERCIAL

## 2025-05-19 VITALS
BODY MASS INDEX: 22.82 KG/M2 | OXYGEN SATURATION: 83 % | TEMPERATURE: 97.5 F | SYSTOLIC BLOOD PRESSURE: 130 MMHG | HEART RATE: 59 BPM | DIASTOLIC BLOOD PRESSURE: 60 MMHG | HEIGHT: 67 IN | WEIGHT: 145.4 LBS

## 2025-05-19 DIAGNOSIS — I10 BENIGN ESSENTIAL HYPERTENSION: ICD-10-CM

## 2025-05-19 DIAGNOSIS — Z00.00 ROUTINE GENERAL MEDICAL EXAMINATION AT HEALTH CARE FACILITY: Primary | ICD-10-CM

## 2025-05-19 DIAGNOSIS — E78.2 HYPERLIPEMIA, MIXED: ICD-10-CM

## 2025-05-19 PROCEDURE — 99397 PER PM REEVAL EST PAT 65+ YR: CPT | Performed by: FAMILY MEDICINE

## 2025-05-19 PROCEDURE — 1036F TOBACCO NON-USER: CPT | Performed by: FAMILY MEDICINE

## 2025-05-19 PROCEDURE — 3078F DIAST BP <80 MM HG: CPT | Performed by: FAMILY MEDICINE

## 2025-05-19 PROCEDURE — 1159F MED LIST DOCD IN RCRD: CPT | Performed by: FAMILY MEDICINE

## 2025-05-19 PROCEDURE — 1160F RVW MEDS BY RX/DR IN RCRD: CPT | Performed by: FAMILY MEDICINE

## 2025-05-19 PROCEDURE — G0439 PPPS, SUBSEQ VISIT: HCPCS | Performed by: FAMILY MEDICINE

## 2025-05-19 PROCEDURE — 1170F FXNL STATUS ASSESSED: CPT | Performed by: FAMILY MEDICINE

## 2025-05-19 PROCEDURE — 3075F SYST BP GE 130 - 139MM HG: CPT | Performed by: FAMILY MEDICINE

## 2025-05-19 ASSESSMENT — ENCOUNTER SYMPTOMS
LOSS OF SENSATION IN FEET: 0
DEPRESSION: 0
OCCASIONAL FEELINGS OF UNSTEADINESS: 0

## 2025-05-19 ASSESSMENT — PATIENT HEALTH QUESTIONNAIRE - PHQ9
2. FEELING DOWN, DEPRESSED OR HOPELESS: NOT AT ALL
1. LITTLE INTEREST OR PLEASURE IN DOING THINGS: NOT AT ALL
SUM OF ALL RESPONSES TO PHQ9 QUESTIONS 1 AND 2: 0

## 2025-05-19 ASSESSMENT — ACTIVITIES OF DAILY LIVING (ADL)
BATHING: INDEPENDENT
TAKING_MEDICATION: INDEPENDENT
DOING_HOUSEWORK: INDEPENDENT
GROCERY_SHOPPING: INDEPENDENT
DRESSING: INDEPENDENT
MANAGING_FINANCES: INDEPENDENT

## 2025-05-19 NOTE — PATIENT INSTRUCTIONS
Immunizations recommended:  --Flu shot every fall.  --Prevnar 20 once, to reduce risk of pneumonia, which you've done.  --Shingrix, to reduce the risk of shingles.  It can be done at the pharmacy.    --Tetanus every 10 years.  --Covid vaccine.  --RSV vaccine once.    Colon cancer screening no long needed.    You should try to get 150 minutes of exercise weekly.  Be sure to eat a diet rich in fruits and vegetables, and low in saturated fats and sodium.    Make sure to wear sun screen when outside, and check for changing or unusual moles.  Skin lesion on left collarbone  area  looks like a benign seborrheic keratosis.  Continue  to  monitor.     I recommend you get a Living Will and Durable Power of  for Healthcare. These documents state what care you would want if you couldn't speak for yourself. They help your loved ones in caring for you if that happens.   Once you have those forms completed, you can keep a copy on file with your doctor.     Specialists being seen:  Dr. Maldonado for prostate.     BP is borderline high.  Continue lisinopril 20 mg, hydrochlorothiazide 12.5 mg until done with current bottle, then  will change to 20-25 for a little better control.  Continue   verapamil 240 mg daily.                                                                                      Blood Pressure Treatment goals include:                                                                                    BP of 120/80, with no higher than 140/85 on consistent basis.                               Exercise at 150 minutes weekly.  Eat a balanced diet, rich in fruits and vegetables, low in sodium and processed foods.  If you are overweight, work toward a goal BMI of less than 25.     Continue atorvastatin for cholesterol.    Follow up 4-6 months.  Will  check fasting blood work then.          BP is good.  Continue lisinopril hydrochlorothiazide  20-25  Continue   verapamil 240 mg daily.                                   "                                                    Blood Pressure Treatment goals include:                                                                                    BP of 120/80, with no higher than 140/85 on consistent basis.                               Exercise at 150 minutes weekly.  Eat a balanced diet, rich in fruits and vegetables, low in sodium and processed foods.  If you are overweight, work toward a goal BMI of less than 25.     Continue atorvastatin for cholesterol.    Follow up 6 months for Wellness visit and recheck.  Will  check fasting blood work then.        Discussed test ordered by neurologist, for B12 and thyroid, as those are to look for treatable reasons for memory issues.  MRI, likewise is to evaluate for possible causes to memory loss.  Sertraline 25 mg is low dose medication for anxiety and depression, which can often impact memory.    Discussed the events with woman in neighborhood.  At this point it is best to let it go.  Do not try to be a friend or \"fix\" the problem, as it continues to cause problems.  "

## 2025-05-19 NOTE — PROGRESS NOTES
"Subjective   Reason for Visit: Jaspal Anderson is an 79 y.o. male here for a Medicare Wellness visit.     Past Medical, Surgical, and Family History reviewed and updated in chart.    Reviewed all medications by prescribing practitioner or clinical pharmacist (such as prescriptions, OTCs, herbal therapies and supplements) and documented in the medical record.    Patient here for medicare wellness and to follow up on ongoing problems.    Last visit he was referred for neuro evaluation, after ongoing incident with woman in neighborhood, who said he was harassing her.  He got in trouble again in November for calling to wish her happy birthday, and in December for sending a [employer] card.  Neuro ordered B12 and thyroid level for memory.  Also ordered brain MRI.  Prescribed 25 mg sertraline, which he has not started taking.  He wanted to understand what these things were for, and if they were needed.    He has had a fall on the stairs.  Notes more problems with balance if steps on stick, etc.  He is not as quick to react.  No dizziness  No CP.  No swelling in ankles.    Saw urologist, and mentioned having constipation.  Constipation from November to December.  Urologist  recommended metamucil, but he never took it. It resolved itself.  May have been eating differently over the holidays.  Doesn't drink a lot water.    Walks for exercise.  Sister brings meals, and makes some himself  Sometimes goes to [employer] for lunch.    131/71 last BP that he checked.    Had blood work in November that was good--CMP, lipids.        Patient Care Team:  Tracie Foy MD as PCP - General (Family Medicine)     Review of Systems    Objective   Vitals:  /60 (BP Location: Right arm, Patient Position: Sitting, BP Cuff Size: Adult)   Pulse 59   Temp 36.4 °C (97.5 °F) (Temporal)   Ht 1.702 m (5' 7\")   Wt 66 kg (145 lb 6.4 oz)   SpO2 (!) 83%   BMI 22.77 kg/m²       Physical Exam  Vitals reviewed.   Constitutional:       " Appearance: Normal appearance.   Cardiovascular:      Rate and Rhythm: Normal rate and regular rhythm.      Heart sounds: No murmur heard.  Pulmonary:      Effort: Pulmonary effort is normal.      Breath sounds: Normal breath sounds.   Musculoskeletal:      Right lower leg: No edema.      Left lower leg: No edema.   Neurological:      Mental Status: He is alert.   Psychiatric:         Mood and Affect: Mood normal.         Behavior: Behavior normal.         Assessment & Plan  Benign essential hypertension         Hyperlipemia, mixed         Body mass index (BMI) of 22.0 to 22.9 in adult         Routine general medical examination at health care facility    Orders:    1 Year Follow Up In [facility] - PCP - Wellness Exam; Future

## 2025-06-15 DIAGNOSIS — I10 HTN (HYPERTENSION), BENIGN: ICD-10-CM

## 2025-06-16 RX ORDER — VERAPAMIL HCL 240 MG
240 TABLET, EXTENDED RELEASE ORAL DAILY
Qty: 90 TABLET | Refills: 1 | Status: SHIPPED | OUTPATIENT
Start: 2025-06-16

## 2025-07-01 DIAGNOSIS — I10 HTN (HYPERTENSION), BENIGN: ICD-10-CM

## 2025-07-02 RX ORDER — LISINOPRIL AND HYDROCHLOROTHIAZIDE 20; 25 MG/1; MG/1
1 TABLET ORAL DAILY
Qty: 90 TABLET | Refills: 1 | Status: SHIPPED | OUTPATIENT
Start: 2025-07-02 | End: 2025-12-29

## 2025-11-19 ENCOUNTER — APPOINTMENT (OUTPATIENT)
Dept: PRIMARY CARE | Facility: CLINIC | Age: 80
End: 2025-11-19
Payer: COMMERCIAL